# Patient Record
Sex: MALE | Race: WHITE | Employment: STUDENT | URBAN - METROPOLITAN AREA
[De-identification: names, ages, dates, MRNs, and addresses within clinical notes are randomized per-mention and may not be internally consistent; named-entity substitution may affect disease eponyms.]

---
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
## 2023-01-04 ENCOUNTER — TELEPHONE (OUTPATIENT)
Dept: PRIMARY CARE CLINIC | Age: 18
End: 2023-01-04

## 2023-01-04 DIAGNOSIS — J40 BRONCHITIS: Primary | ICD-10-CM

## 2023-01-04 RX ORDER — BENZONATATE 200 MG/1
200 CAPSULE ORAL 3 TIMES DAILY PRN
Qty: 30 CAPSULE | Refills: 0 | Status: SHIPPED | OUTPATIENT
Start: 2023-01-04 | End: 2023-01-11

## 2023-01-04 RX ORDER — PREDNISONE 10 MG/1
TABLET ORAL
Qty: 18 TABLET | Refills: 0 | Status: SHIPPED | OUTPATIENT
Start: 2023-01-04

## 2023-01-04 RX ORDER — AZITHROMYCIN 250 MG/1
250 TABLET, FILM COATED ORAL SEE ADMIN INSTRUCTIONS
Qty: 6 TABLET | Refills: 0 | Status: SHIPPED | OUTPATIENT
Start: 2023-01-04 | End: 2023-01-09

## 2023-04-25 ENCOUNTER — OFFICE VISIT (OUTPATIENT)
Dept: PRIMARY CARE CLINIC | Age: 18
End: 2023-04-25
Payer: COMMERCIAL

## 2023-04-25 VITALS
WEIGHT: 152 LBS | HEIGHT: 63 IN | BODY MASS INDEX: 26.93 KG/M2 | DIASTOLIC BLOOD PRESSURE: 70 MMHG | OXYGEN SATURATION: 98 % | HEART RATE: 85 BPM | SYSTOLIC BLOOD PRESSURE: 122 MMHG | RESPIRATION RATE: 16 BRPM

## 2023-04-25 DIAGNOSIS — M41.9 SCOLIOSIS, UNSPECIFIED SCOLIOSIS TYPE, UNSPECIFIED SPINAL REGION: Primary | ICD-10-CM

## 2023-04-25 PROCEDURE — G8419 CALC BMI OUT NRM PARAM NOF/U: HCPCS | Performed by: FAMILY MEDICINE

## 2023-04-25 PROCEDURE — 1036F TOBACCO NON-USER: CPT | Performed by: FAMILY MEDICINE

## 2023-04-25 PROCEDURE — G8427 DOCREV CUR MEDS BY ELIG CLIN: HCPCS | Performed by: FAMILY MEDICINE

## 2023-04-25 PROCEDURE — 99213 OFFICE O/P EST LOW 20 MIN: CPT | Performed by: FAMILY MEDICINE

## 2023-04-25 ASSESSMENT — ENCOUNTER SYMPTOMS
RHINORRHEA: 0
SHORTNESS OF BREATH: 0
BACK PAIN: 1
SORE THROAT: 0
SINUS PRESSURE: 0
CONSTIPATION: 0
CHEST TIGHTNESS: 0
DIARRHEA: 0
VOMITING: 0
ABDOMINAL PAIN: 0
APNEA: 0
EYE REDNESS: 0
COLOR CHANGE: 0
EYE PAIN: 0
BLOOD IN STOOL: 0
COUGH: 0
NAUSEA: 0
EYE ITCHING: 0
WHEEZING: 0

## 2023-04-25 ASSESSMENT — PATIENT HEALTH QUESTIONNAIRE - PHQ9
SUM OF ALL RESPONSES TO PHQ QUESTIONS 1-9: 0
2. FEELING DOWN, DEPRESSED OR HOPELESS: 0
SUM OF ALL RESPONSES TO PHQ9 QUESTIONS 1 & 2: 0
SUM OF ALL RESPONSES TO PHQ QUESTIONS 1-9: 0
1. LITTLE INTEREST OR PLEASURE IN DOING THINGS: 0

## 2023-04-25 NOTE — PROGRESS NOTES
Chief Complaint:     Chief Complaint   Patient presents with    Scoliosis     Has been worse lately and coughing a lot          Scoliosis    The scoliosis was noted at 13years of age. It was first noted by the parent. There was previous evaluation for scoliosis. Scoliosis symptoms include back pain. Pertinent negatives for scoliosis include no RUE paresis, RLE paresis, tingling, numbness, bladder incontinence and bowel incontinence. Back Pain  This is a recurrent problem. The current episode started more than 1 month ago. The problem occurs daily. The problem is unchanged. The quality of the pain is described as aching. The pain is mild. The symptoms are aggravated by bending, position and twisting. Pertinent negatives include no abdominal pain, chest pain, dysuria, fever, headaches, numbness, paresis, paresthesias, pelvic pain, perianal numbness, tingling, weakness or weight loss. He has tried chiropractic manipulation and home exercises for the symptoms. The treatment provided mild relief. Patient Active Problem List   Diagnosis    Other fatigue    Personal history of atrial septal defect       Past Medical History:   Diagnosis Date    ADHD (attention deficit hyperactivity disorder)     ASD (atrial septal defect)     Bladder wall thickening     Hypertension     Hypoplastic kidney     Kidney disease, chronic, stage II (mild, EGFR 60+ ml/min)     Kidney failure     stage two    Migraine     Scoliosis     Slow transit constipation     Weight loss        Past Surgical History:   Procedure Laterality Date    ADENOIDECTOMY      TONSILLECTOMY         Current Outpatient Medications   Medication Sig Dispense Refill    amLODIPine (NORVASC) 10 MG tablet       methylphenidate (CONCERTA) 27 MG extended release tablet Take 1 tablet by mouth every morning. No current facility-administered medications for this visit.        Allergies   Allergen Reactions    Compazine [Prochlorperazine] Hives    Lisinopril Other

## 2023-05-03 LAB
ALBUMIN SERPL-MCNC: 4.6 G/DL (ref 3.5–5.2)
ANION GAP SERPL CALCULATED.3IONS-SCNC: 11 MMOL/L (ref 7–16)
BASOPHILS # BLD: 0.02 E9/L (ref 0–0.2)
BASOPHILS NFR BLD: 0.4 % (ref 0–2)
BUN SERPL-MCNC: 21 MG/DL (ref 6–20)
CALCIUM SERPL-MCNC: 9.3 MG/DL (ref 8.6–10.2)
CHLORIDE SERPL-SCNC: 105 MMOL/L (ref 98–107)
CO2 SERPL-SCNC: 27 MMOL/L (ref 22–29)
CREAT SERPL-MCNC: 1 MG/DL (ref 0.7–1.2)
EOSINOPHIL # BLD: 0.46 E9/L (ref 0.05–0.5)
EOSINOPHIL NFR BLD: 8.5 % (ref 0–6)
ERYTHROCYTE [DISTWIDTH] IN BLOOD BY AUTOMATED COUNT: 13.8 FL (ref 11.5–15)
GLUCOSE SERPL-MCNC: 92 MG/DL (ref 74–99)
HCT VFR BLD AUTO: 48.6 % (ref 37–54)
HGB BLD-MCNC: 15.8 G/DL (ref 12.5–16.5)
IMM GRANULOCYTES # BLD: 0.01 E9/L
IMM GRANULOCYTES NFR BLD: 0.2 % (ref 0–5)
LYMPHOCYTES # BLD: 1.87 E9/L (ref 1.5–4)
LYMPHOCYTES NFR BLD: 34.4 % (ref 20–42)
MCH RBC QN AUTO: 28.6 PG (ref 26–35)
MCHC RBC AUTO-ENTMCNC: 32.5 % (ref 32–34.5)
MCV RBC AUTO: 87.9 FL (ref 80–99.9)
MONOCYTES # BLD: 0.64 E9/L (ref 0.1–0.95)
MONOCYTES NFR BLD: 11.8 % (ref 2–12)
NEUTROPHILS # BLD: 2.44 E9/L (ref 1.8–7.3)
NEUTS SEG NFR BLD: 44.7 % (ref 43–80)
PHOSPHATE SERPL-MCNC: 4.1 MG/DL (ref 2.5–4.5)
PLATELET # BLD AUTO: 264 E9/L (ref 130–450)
PMV BLD AUTO: 10.1 FL (ref 7–12)
POTASSIUM SERPL-SCNC: 4.3 MMOL/L (ref 3.5–5)
PTH-INTACT SERPL-MCNC: 18 PG/ML (ref 15–65)
RBC # BLD AUTO: 5.53 E12/L (ref 3.8–5.8)
SODIUM SERPL-SCNC: 143 MMOL/L (ref 132–146)
VITAMIN D 25-HYDROXY: 23 NG/ML (ref 30–100)
WBC # BLD: 5.4 E9/L (ref 4.5–11.5)

## 2023-05-05 LAB
Lab: NORMAL
REPORT: NORMAL
THIS TEST SENT TO: NORMAL

## 2024-02-01 ENCOUNTER — OFFICE VISIT (OUTPATIENT)
Dept: PRIMARY CARE CLINIC | Age: 19
End: 2024-02-01
Payer: COMMERCIAL

## 2024-02-01 VITALS
HEIGHT: 63 IN | DIASTOLIC BLOOD PRESSURE: 85 MMHG | RESPIRATION RATE: 19 BRPM | HEART RATE: 77 BPM | OXYGEN SATURATION: 98 % | WEIGHT: 146 LBS | TEMPERATURE: 98.4 F | BODY MASS INDEX: 25.87 KG/M2 | SYSTOLIC BLOOD PRESSURE: 126 MMHG

## 2024-02-01 DIAGNOSIS — R10.84 GENERALIZED ABDOMINAL PAIN: ICD-10-CM

## 2024-02-01 DIAGNOSIS — K21.9 GASTROESOPHAGEAL REFLUX DISEASE WITHOUT ESOPHAGITIS: ICD-10-CM

## 2024-02-01 DIAGNOSIS — R11.2 NAUSEA AND VOMITING IN ADULT PATIENT: ICD-10-CM

## 2024-02-01 PROCEDURE — 99213 OFFICE O/P EST LOW 20 MIN: CPT | Performed by: NURSE PRACTITIONER

## 2024-02-01 RX ORDER — OMEPRAZOLE 20 MG/1
20 CAPSULE, DELAYED RELEASE ORAL
Qty: 60 CAPSULE | Refills: 0 | Status: SHIPPED | OUTPATIENT
Start: 2024-02-01

## 2024-02-01 RX ORDER — SPIRONOLACTONE 50 MG/1
TABLET, FILM COATED ORAL
COMMUNITY
Start: 2024-01-15

## 2024-02-01 RX ORDER — ESTRADIOL 0.1 MG/D
PATCH, EXTENDED RELEASE TRANSDERMAL
COMMUNITY
Start: 2024-01-15

## 2024-02-01 RX ORDER — ONDANSETRON 4 MG/1
4 TABLET, ORALLY DISINTEGRATING ORAL 3 TIMES DAILY PRN
Qty: 15 TABLET | Refills: 0 | Status: SHIPPED | OUTPATIENT
Start: 2024-02-01 | End: 2024-02-06

## 2024-02-01 NOTE — PROGRESS NOTES
Chief Complaint:   Emesis (For the past 3 days ) and Abdominal Pain      History of Present Illness   Source of history provided by:  patient/mother      Lior Barrera is a 18 y.o. old adult with a past medical history of:   Past Medical History:   Diagnosis Date    ADHD (attention deficit hyperactivity disorder)     ASD (atrial septal defect)     Bladder wall thickening     Hypertension     Hypoplastic kidney     Kidney disease, chronic, stage II (mild, EGFR 60+ ml/min)     Kidney failure     stage two    Migraine     Scoliosis     Slow transit constipation     Weight loss        [facility] with c/o heartburn, generalized abdominal pain and N/V  for the past 3 days.  Pt has approximately 1 emesis daily.  No fever noted.  Pt stated as soon as he eats anything his stomach starts to hurt. Pt has been under a lot of stress and anxiety recently. Pt also recently started hormones. Denies any diarrhea, bloody/tarry stools,  dysuria, hematuria, CP, progressive SOB, dizziness, or lethargy.           ROS    Unless otherwise stated in this report or unable to obtain because of the patient's clinical or mental status as evidenced by the medical record, this patients's positive and negative responses for Review of Systems, constitutional, psych, eyes, ENT, cardiovascular, respiratory, gastrointestinal, neurological, genitourinary, musculoskeletal, integument systems and systems related to the presenting problem are either stated in the preceding or were not pertinent or were negative for the symptoms and/or complaints related to the medical problem.    Past Surgical History:  has a past surgical history that includes Tonsillectomy and Adenoidectomy.  Social History:  reports that she has never smoked. She has never used smokeless tobacco.  Family History: family history is not on file.   Allergies: Compazine [prochlorperazine], Lisinopril, Augmentin [amoxicillin-pot clavulanate], and Rocephin

## 2024-04-19 ENCOUNTER — HOSPITAL ENCOUNTER (OUTPATIENT)
Dept: GENERAL RADIOLOGY | Age: 19
End: 2024-04-19
Payer: COMMERCIAL

## 2024-04-19 ENCOUNTER — HOSPITAL ENCOUNTER (OUTPATIENT)
Age: 19
Discharge: HOME | End: 2024-04-19
Payer: COMMERCIAL

## 2024-04-19 DIAGNOSIS — M41.9 SCOLIOSIS, UNSPECIFIED SCOLIOSIS TYPE, UNSPECIFIED SPINAL REGION: ICD-10-CM

## 2024-04-19 PROCEDURE — 72082 X-RAY EXAM ENTIRE SPI 2/3 VW: CPT

## 2024-08-15 RX ORDER — AMLODIPINE BESYLATE 10 MG/1
10 TABLET ORAL DAILY
Qty: 90 TABLET | Refills: 0 | Status: SHIPPED | OUTPATIENT
Start: 2024-08-15

## 2024-08-22 LAB
ESTRADIOL LEVEL: 37.3 PG/ML
TESTOSTERONE TOTAL: 312 NG/DL (ref 249–836)

## 2024-09-12 PROBLEM — Z87.74 PERSONAL HISTORY OF ATRIAL SEPTAL DEFECT: Status: RESOLVED | Noted: 2022-10-18 | Resolved: 2024-09-12

## 2024-09-12 PROBLEM — R00.2 PALPITATIONS: Status: ACTIVE | Noted: 2024-09-12

## 2024-09-12 PROBLEM — R53.83 OTHER FATIGUE: Status: RESOLVED | Noted: 2019-09-07 | Resolved: 2024-09-12

## 2024-09-16 ENCOUNTER — OFFICE VISIT (OUTPATIENT)
Dept: CARDIOLOGY CLINIC | Age: 19
End: 2024-09-16
Payer: COMMERCIAL

## 2024-09-16 VITALS
RESPIRATION RATE: 18 BRPM | HEART RATE: 60 BPM | DIASTOLIC BLOOD PRESSURE: 65 MMHG | BODY MASS INDEX: 28.74 KG/M2 | WEIGHT: 162.2 LBS | SYSTOLIC BLOOD PRESSURE: 128 MMHG | HEIGHT: 63 IN

## 2024-09-16 DIAGNOSIS — R00.2 PALPITATIONS: Primary | ICD-10-CM

## 2024-09-16 PROCEDURE — 93000 ELECTROCARDIOGRAM COMPLETE: CPT | Performed by: INTERNAL MEDICINE

## 2024-09-16 PROCEDURE — 99203 OFFICE O/P NEW LOW 30 MIN: CPT | Performed by: INTERNAL MEDICINE

## 2024-09-16 RX ORDER — OXCARBAZEPINE 300 MG/1
300 TABLET, FILM COATED ORAL 2 TIMES DAILY
COMMUNITY

## 2025-05-31 LAB
ESTRADIOL LEVEL: 35.4 PG/ML
TESTOSTERONE TOTAL: 961 NG/DL (ref 249–836)